# Patient Record
Sex: MALE | Race: WHITE
[De-identification: names, ages, dates, MRNs, and addresses within clinical notes are randomized per-mention and may not be internally consistent; named-entity substitution may affect disease eponyms.]

---

## 2018-11-20 ENCOUNTER — HOSPITAL ENCOUNTER (OUTPATIENT)
Dept: HOSPITAL 72 - RAD | Age: 63
Discharge: HOME | End: 2018-11-20
Payer: COMMERCIAL

## 2018-11-20 DIAGNOSIS — R05: Primary | ICD-10-CM

## 2018-11-20 PROCEDURE — 71046 X-RAY EXAM CHEST 2 VIEWS: CPT

## 2018-11-20 NOTE — DIAGNOSTIC IMAGING REPORT
Indication: Cough

 

Technique: XRAY Chest 2v

 

Comparison: None

 

Findings: Heart size and mediastinal contours within normal limits. There is no focal

airspace consolidation. Mild blunting of the lateral costophrenic sulci posteriorly

likely related to pleural thickening. No significant pleural effusion. No evidence of

pneumothorax. Imaged central airways appear patent. There is no radiopaque foreign

body. There is mild degenerative change of the spine. Question mild pectus deformity.

No acute osseous abnormality.

 

IMPRESSION: 

No radiographic evidence of acute cardiopulmonary disease. Specifically, no focal

airspace consolidation as questioned clinically.